# Patient Record
(demographics unavailable — no encounter records)

---

## 2025-03-03 NOTE — HISTORY OF PRESENT ILLNESS
[de-identified] : Pt c/o cough, body aches and sore throat since Thursday. Pt had fever 101 over the weekend. Mom gave pt Albuterol nebuilzer and inahler for cough, mucinex and Tydenol for fever. Pt mentioned he has no apetite and c/o fatigue  [FreeTextEntry6] :  + congestion and cough X4days, + ST, no ear pain, no n/v/c/d, eating and drinking well, normal voiding.  + fever X 4days, pt was wheezing at home- pt used mom's albuterol MDI yesterday which helped

## 2025-03-03 NOTE — DISCUSSION/SUMMARY
[FreeTextEntry1] : D/W caregiver viral illness with pharyngitis, rapid strep negative, throat cx sent out, continue supportive care, monitor for dehydration, difficulty swallowing, persistent fever and call if occurring for recheck. Will trial albuterol with spacer as below for cough/wheezing- pt instructed in use.  If throat cx positive pt may take amoxicillin 500mg PO BID [id number].   COVID-19 and flu rapid negative today-. Answered patient questions about COVID-19 including signs and symptoms, self home care and proper isolation precautions. time spent: 30min

## 2025-03-03 NOTE — REVIEW OF SYSTEMS
[Fever] : fever [Nasal Congestion] : nasal congestion [Sore Throat] : no sore throat [Cough] : cough [Vomiting] : no vomiting [Diarrhea] : no diarrhea

## 2025-03-14 NOTE — PLAN
[TextEntry] : - Start famotidine 40mg daily - Start Flonase 1 spray BID for congestion  - Discussed hydration strategies - Encourage age appropriate solid food diet - Avoid fruit juice and dairy - Discussed use of probiotics - Follow up as needed If throat cx +, start amoxicillin 500mg BID x 10 days

## 2025-03-14 NOTE — HISTORY OF PRESENT ILLNESS
[de-identified] : Pt reports decreased appetite and abdominal pain with eating or drinking. Afebrile recently at home per pt. R ear pain. Sore throat per pt. [FreeTextEntry6] : In addition to above: Was seen on 3/3, COVID/flu/Strep neg Still with lingering cough and congestion, but better, no longer needs albuterol, no more fevers Most bothersome symptoms is abdominal pain x 1 week Initially had vomiting and diarrhea 6 days ago but that's resolved Abdominal pain is 8/10 at its worst, exacerbated by eating Still with lack of appetite Had ST but not currently Body aches have resolved

## 2025-03-14 NOTE — PHYSICAL EXAM
[TextEntry] : General: alert and active in no apparent distress Eyes: conjunctiva clear, EOMI Ears: TMs translucent bilaterally, normal landmarks noted, normal canals Nose: no rhinorrhea, + congestion OP: no tonsillar enlargement/exudate, no lesions, no posterior pharyngeal erythema Neck: supple, no adenopathy Lungs: clear to auscultation bilaterally, good air exchange, no retractions, comfortable WOB CVS: Normal rate, regular rhythm, no murmur Abdomen: periumbilical tenderness, soft, nondistended, and no hepatosplenomegaly or masses, normoactive bowel sounds Skin: No rashes, lesions or skin changes